# Patient Record
Sex: FEMALE | URBAN - METROPOLITAN AREA
[De-identification: names, ages, dates, MRNs, and addresses within clinical notes are randomized per-mention and may not be internally consistent; named-entity substitution may affect disease eponyms.]

---

## 2024-02-12 ENCOUNTER — TRANSCRIBE ORDERS (OUTPATIENT)
Dept: SCHEDULING | Age: 69
End: 2024-02-12

## 2024-02-12 DIAGNOSIS — N95.9 UNSPECIFIED MENOPAUSAL AND PERIMENOPAUSAL DISORDER: Primary | ICD-10-CM

## 2024-02-12 DIAGNOSIS — Z13.820 SPECIAL SCREENING FOR OSTEOPOROSIS: ICD-10-CM

## 2024-03-19 ENCOUNTER — HOSPITAL ENCOUNTER (OUTPATIENT)
Dept: MAMMOGRAPHY | Age: 69
Discharge: HOME | End: 2024-03-22
Payer: MEDICARE

## 2024-03-19 VITALS — WEIGHT: 132 LBS | HEIGHT: 66 IN | BODY MASS INDEX: 21.21 KG/M2

## 2024-03-19 DIAGNOSIS — Z13.820 SPECIAL SCREENING FOR OSTEOPOROSIS: ICD-10-CM

## 2024-03-19 DIAGNOSIS — Z12.31 ENCOUNTER FOR SCREENING MAMMOGRAM FOR MALIGNANT NEOPLASM OF BREAST: ICD-10-CM

## 2024-03-19 DIAGNOSIS — N95.9 UNSPECIFIED MENOPAUSAL AND PERIMENOPAUSAL DISORDER: ICD-10-CM

## 2024-03-19 PROCEDURE — 77063 BREAST TOMOSYNTHESIS BI: CPT

## 2024-03-19 PROCEDURE — 77080 DXA BONE DENSITY AXIAL: CPT

## 2024-05-03 ENCOUNTER — HOSPITAL ENCOUNTER (OUTPATIENT)
Dept: MAMMOGRAPHY | Age: 69
End: 2024-05-03
Payer: MEDICARE

## 2024-05-03 DIAGNOSIS — R92.8 OTHER ABNORMAL AND INCONCLUSIVE FINDINGS ON DIAGNOSTIC IMAGING OF BREAST: ICD-10-CM

## 2024-05-03 PROCEDURE — G0279 TOMOSYNTHESIS, MAMMO: HCPCS

## 2024-05-29 ENCOUNTER — APPOINTMENT (RX ONLY)
Dept: URBAN - METROPOLITAN AREA CLINIC 330 | Facility: CLINIC | Age: 69
Setting detail: DERMATOLOGY
End: 2024-05-29

## 2024-05-29 DIAGNOSIS — L82.1 OTHER SEBORRHEIC KERATOSIS: ICD-10-CM

## 2024-05-29 DIAGNOSIS — D22 MELANOCYTIC NEVI: ICD-10-CM

## 2024-05-29 DIAGNOSIS — D18.0 HEMANGIOMA: ICD-10-CM

## 2024-05-29 DIAGNOSIS — L81.4 OTHER MELANIN HYPERPIGMENTATION: ICD-10-CM

## 2024-05-29 PROBLEM — D18.01 HEMANGIOMA OF SKIN AND SUBCUTANEOUS TISSUE: Status: ACTIVE | Noted: 2024-05-29

## 2024-05-29 PROBLEM — D22.5 MELANOCYTIC NEVI OF TRUNK: Status: ACTIVE | Noted: 2024-05-29

## 2024-05-29 PROCEDURE — ? TREATMENT REGIMEN

## 2024-05-29 PROCEDURE — ? BODY PHOTOGRAPHY

## 2024-05-29 PROCEDURE — ? COUNSELING

## 2024-05-29 PROCEDURE — 99203 OFFICE O/P NEW LOW 30 MIN: CPT

## 2024-05-29 ASSESSMENT — LOCATION ZONE DERM
LOCATION ZONE: TRUNK
LOCATION ZONE: ARM
LOCATION ZONE: FACE
LOCATION ZONE: LEG

## 2024-05-29 ASSESSMENT — LOCATION DETAILED DESCRIPTION DERM
LOCATION DETAILED: INFERIOR THORACIC SPINE
LOCATION DETAILED: LEFT INFERIOR CENTRAL MALAR CHEEK
LOCATION DETAILED: RIGHT DISTAL DORSAL FOREARM
LOCATION DETAILED: RIGHT MEDIAL MALAR CHEEK
LOCATION DETAILED: SUPERIOR THORACIC SPINE
LOCATION DETAILED: RIGHT PROXIMAL DORSAL FOREARM
LOCATION DETAILED: LEFT PROXIMAL PRETIBIAL REGION
LOCATION DETAILED: LEFT DISTAL DORSAL FOREARM
LOCATION DETAILED: LEFT PROXIMAL DORSAL FOREARM

## 2024-05-29 ASSESSMENT — LOCATION SIMPLE DESCRIPTION DERM
LOCATION SIMPLE: RIGHT CHEEK
LOCATION SIMPLE: LEFT FOREARM
LOCATION SIMPLE: RIGHT FOREARM
LOCATION SIMPLE: UPPER BACK
LOCATION SIMPLE: LEFT PRETIBIAL REGION
LOCATION SIMPLE: LEFT CHEEK

## 2024-05-29 NOTE — PROCEDURE: BODY PHOTOGRAPHY
Detail Level: Detailed
Was The Entire Body Photographed (Cannot Bill Unless Entire Body Photographed)?: Please Select Yes or No - If you select Yes you are confirming that you took full body photographs
Consent was obtained for whole body photography. Patient understands that photograph costs may not be covered by insurance.
Whole Body Statement: The whole body was photographed today.
Number Of Photographs (Optional- Will Not Render If 0): 1
Reason For Photography: The patient is obtaining whole body photography to observe existing suspicious moles and or monitor for the appearance of any new lesions.
normal

## 2024-05-29 NOTE — PROCEDURE: MIPS QUALITY
Quality 226: Preventive Care And Screening: Tobacco Use: Screening And Cessation Intervention: Patient screened for tobacco use and is an ex/non-smoker
Quality 110: Preventive Care And Screening: Influenza Immunization: Influenza Immunization Administered during Influenza season
Quality 130: Documentation Of Current Medications In The Medical Record: Current Medications Documented
Quality 402: Tobacco Use And Help With Quitting Among Adolescents: Patient screened for tobacco and never smoked
Quality 47: Advance Care Plan: Advance care planning not documented, reason not otherwise specified.
Quality 431: Preventive Care And Screening: Unhealthy Alcohol Use - Screening: Patient not identified as an unhealthy alcohol user when screened for unhealthy alcohol use using a systematic screening method
Detail Level: Detailed

## 2024-07-02 ENCOUNTER — OFFICE VISIT (OUTPATIENT)
Dept: ORTHOPEDIC SURGERY | Age: 69
End: 2024-07-02
Payer: MEDICARE

## 2024-07-02 DIAGNOSIS — R52 PAIN: ICD-10-CM

## 2024-07-02 DIAGNOSIS — S82.841A BIMALLEOLAR ANKLE FRACTURE, RIGHT, CLOSED, INITIAL ENCOUNTER: Primary | ICD-10-CM

## 2024-07-02 PROCEDURE — 99204 OFFICE O/P NEW MOD 45 MIN: CPT | Performed by: PHYSICIAN ASSISTANT

## 2024-07-02 PROCEDURE — 1123F ACP DISCUSS/DSCN MKR DOCD: CPT | Performed by: PHYSICIAN ASSISTANT

## 2024-07-02 RX ORDER — HYDROCODONE BITARTRATE AND ACETAMINOPHEN 7.5; 325 MG/1; MG/1
1 TABLET ORAL EVERY 6 HOURS PRN
Qty: 20 TABLET | Refills: 0 | Status: SHIPPED | OUTPATIENT
Start: 2024-07-02 | End: 2024-07-09

## 2024-07-02 RX ORDER — HYDROCODONE BITARTRATE AND ACETAMINOPHEN 5; 325 MG/1; MG/1
1 TABLET ORAL EVERY 4 HOURS PRN
Qty: 30 TABLET | Refills: 0 | Status: CANCELLED | OUTPATIENT
Start: 2024-07-02 | End: 2024-07-07

## 2024-07-02 NOTE — PROGRESS NOTES
Name: Ana Maria Hicks  YOB: 1955  Gender: female  MRN: [id number]    Treatment Plan:   I had a thorough discussion with the patient regarding the treatment plan   Bimalleolar ankle fracture    Patient to proceed with surgery with Dr. Mccauley.  Consent to read: Open reduction and internal fixation of right ankle    Splint reinforced today.  Strict NWB    Patient understands and in full agreement with the treatment plan.    Weight-bearing status: NWB        Return to work/work restrictions: none  Norco 5/325, 1 tab p.o. q6 h PRN pain, disp #20. I discussed the side effects of nausea and vomiting and GI distress.  I also discussed how this is a narcotic and its abuse potential.  I told the patient I am not a long term pain medication provider so this Rx is for a short time only.  I explained the need for accountability and honestly with narcotics.  I explained how overdosing can result in respiratory suppression and potentially death. I also discussed how this medication contains tylenol and they should not take tylenol or other medications that contain tylenol. I also discussed tylenols affect on the liver and advised no alcohol intake while taking this medication.     Patient will be called by Dr. Mccauley's coordinator to schedule surgery       CC: RightAnkle Pain    HPI: Ana Maria Hicks is a 68 y.o. female who sustained a fall on Sunday, 6/30/2024.  Patient states that she was in the parking lot and felt when she missed stepped causing her ankle to roll off the side of the curb.  She presented to the emergency room at [facility] where she was found to have a bimalleolar ankle fracture with dislocation.  Hematoma block was performed and ankle was set.  She was placed in a posterior slab splint splint with a stirrup.  She has been nonweightbearing..  Today they have difficulty walking and bearing weight on this ankle due to pain. They describe pain with movement too.     ROS:  Patient Denies fever/chills,

## 2024-07-02 NOTE — PROGRESS NOTES
Orthopaedic Trauma Clinic Note    Name: Ana Maria Hicks  YOB: 1955  Gender: female  MRN: [id number]  PCP:    CC: Right ankle pain    HPI:   Ana Maria Hicks is a 68 y.o. female with a chief complaint of right ankle pain.  -Sig PMH:  -Sig PSH:    Patient had a mechanical fall on 7/2/2024 while stepping off a curb.  She presented to [facility] ED where radiographs demonstrated right ankle fracture  Referred for further management    No prior history of right ankle trauma  Denies numbness, tingling, weakness in affected extremity.    Patient is accompanied by her  in the clinic today  And is controlled with oral medications    ROS/Meds/PSH/PMH/FH/SH:  Pertinent details discussed in HPI    Physical Examination:  General:  Awake and conversive, no distress, well nourished, age-appropriate.  Neurological: A&O x 3, normal coordination and tone.  Psych: Normal mood, affect and behavior. Normal thought content and judgment. Cooperative w/ exam.    Musculoskeletal Exam:  Right Lower Extremity  - Skin: Intact, though she does have significant swelling  - ROM: Ankle deferred, toes WNL  - Sensation: SILT Sa/Collazo/T/SP/DP   - Motor: 5/5 TA/EHL/FHL/GS   - Digits warm, well-perfused, brisk cap refill    Gait: Exam deferred    Imaging:   I personally reviewed and interpreted:  -7/2/2024: 3 views right ankle demonstrates displaced bimalleolar ankle fracture with Victor B fracture of the distal fibula as well as comminuted fracture of the medial malleolus.  No posterior malleoli or injury        Assessment:     ICD-10-CM    1. Bimalleolar ankle fracture, right, closed, initial encounter  S82.841A traMADol (ULTRAM) 50 MG tablet          Plan:   Imaging was reviewed with the patient and her .  We reviewed natural history of diagnosis and possible complications in detail.   We discussed options for treatment with associated risks and benefits.  -Patient would benefit from open reduction internal fixation right

## 2024-07-03 ENCOUNTER — OFFICE VISIT (OUTPATIENT)
Dept: ORTHOPEDIC SURGERY | Age: 69
End: 2024-07-03
Payer: MEDICARE

## 2024-07-03 DIAGNOSIS — S82.841A BIMALLEOLAR ANKLE FRACTURE, RIGHT, CLOSED, INITIAL ENCOUNTER: Primary | ICD-10-CM

## 2024-07-03 PROCEDURE — 1123F ACP DISCUSS/DSCN MKR DOCD: CPT | Performed by: ORTHOPAEDIC SURGERY

## 2024-07-03 PROCEDURE — 99204 OFFICE O/P NEW MOD 45 MIN: CPT | Performed by: ORTHOPAEDIC SURGERY

## 2024-07-03 RX ORDER — TRAMADOL HYDROCHLORIDE 50 MG/1
50 TABLET ORAL EVERY 4 HOURS PRN
Qty: 30 TABLET | Refills: 0 | Status: SHIPPED | OUTPATIENT
Start: 2024-07-03 | End: 2024-07-08

## 2024-07-03 RX ORDER — ACETAMINOPHEN 500 MG
1000 TABLET ORAL 3 TIMES DAILY PRN
Qty: 180 TABLET | Refills: 0 | Status: ON HOLD | OUTPATIENT
Start: 2024-07-03 | End: 2024-07-10 | Stop reason: HOSPADM

## 2024-07-09 ENCOUNTER — OFFICE VISIT (OUTPATIENT)
Dept: ORTHOPEDIC SURGERY | Age: 69
End: 2024-07-09
Payer: MEDICARE

## 2024-07-09 ENCOUNTER — PREP FOR PROCEDURE (OUTPATIENT)
Dept: ORTHOPEDIC SURGERY | Age: 69
End: 2024-07-09

## 2024-07-09 ENCOUNTER — ANESTHESIA EVENT (OUTPATIENT)
Dept: SURGERY | Age: 69
End: 2024-07-09
Payer: COMMERCIAL

## 2024-07-09 DIAGNOSIS — S82.841A BIMALLEOLAR ANKLE FRACTURE, RIGHT, CLOSED, INITIAL ENCOUNTER: Primary | ICD-10-CM

## 2024-07-09 PROCEDURE — 1123F ACP DISCUSS/DSCN MKR DOCD: CPT | Performed by: ORTHOPAEDIC SURGERY

## 2024-07-09 PROCEDURE — 99213 OFFICE O/P EST LOW 20 MIN: CPT | Performed by: ORTHOPAEDIC SURGERY

## 2024-07-09 RX ORDER — TRAMADOL HYDROCHLORIDE 50 MG/1
50 TABLET ORAL EVERY 6 HOURS PRN
Status: ON HOLD | COMMUNITY
End: 2024-07-10 | Stop reason: HOSPADM

## 2024-07-09 RX ORDER — LEVOTHYROXINE SODIUM 88 UG/1
88 TABLET ORAL DAILY
COMMUNITY

## 2024-07-09 RX ORDER — EZETIMIBE 10 MG/1
10 TABLET ORAL
COMMUNITY

## 2024-07-09 RX ORDER — LOSARTAN POTASSIUM AND HYDROCHLOROTHIAZIDE 12.5; 5 MG/1; MG/1
1 TABLET ORAL DAILY
COMMUNITY

## 2024-07-09 NOTE — PERIOP NOTE
Patient verified name and .  Order for consent  found in EHR and matches case posting; patient verifies procedure.   Type 1B surgery, Pat PHONE assessment complete.  Orders received.  Labs per surgeon: none  Labs per anesthesia protocol: POC K+ DOS    Patient answered medical/surgical history questions at their best of ability. All prior to admission medications documented in EPIC.    Patient instructed to continue taking all prescription medications up to the day of surgery but to take only the following medications the day of surgery according to anesthesia guidelines with a small sip of water: Synthroid, tramadol (if needed) Also, patient is requested to take 2 Tylenol in the morning and then again before bed on the day before surgery. Regular or extra strength may be used.       Patient informed that all vitamins and supplements should be held 7 days prior to surgery and NSAIDS 5 days prior to surgery. Prescription meds to hold:vitamins and supplements. Do not take losartan- HCTZ on the morning of procedure    Patient instructed on the following:    > Arrive at Main Entrance, time of arrival to be called the day before by 1700  > NPO after midnight, unless otherwise indicated, including gum, mints, and ice chips  > Responsible adult must drive patient to the hospital, stay during surgery, and patient will need supervision 24 hours after anesthesia  > Use non moisturizing soap in shower the night before surgery and on the morning of surgery  > All piercings must be removed prior to arrival.    > Leave all valuables (money and jewelry) at home but bring insurance card and ID on DOS.   > You may be required to pay a deductible or co-pay on the day of your procedure. You can pre-pay by calling [phone] if your surgery is at the [facility] or [phone] if your surgery is at the [facility].  > Do not wear make-up, nail polish, lotions, cologne, perfumes, powders, or oil on skin. Artificial nails are not

## 2024-07-09 NOTE — PROGRESS NOTES
both ears.   Nose: Nares normal. Septum midline. Mucosa normal. No drainage or sinus tenderness.   Mouth/Throat: Lips, mucosa, and tongue normal. Teeth and gums normal.   Neck: Supple, symmetrical, trachea midline, no adenopathy, thyroid: no enlargment/tenderness/nodules, no carotid bruit and no JVD.   Back:   Symmetric, no curvature. ROM normal. No CVA tenderness.   Lungs:   Clear to auscultation bilaterally.   Heart:  Regular rate and rhythm, S1, S2 normal, no murmur, click, rub or gallop.   Abdomen:   Soft, non-tender. Bowel sounds normal. No masses,  No organomegaly.       No lymphadenopathy in all 4 extremities  Alignment-she has obvious deformity of her right ankle with some valgus deformity  Range of motion-pain with any range of motion of the right ankle  Vascular-distal pulses palpable in right lower extremity  Sensory/motor-deep tendon reflexes normal right lower extremity.  Motor and sensory function intact.  Stability-gross instability right ankle  Tenderness to palpation throughout the lateral medial malleolar line  Skin-no open wounds  Gait-she cannot put any weight on her right lower extremity    Assessment:       Right closed displaced bimalleolar ankle fracture    Xrays and or studies:    I have reviewed her x-rays which show a right closed displaced bimalleolar ankle fracture    [id number] right minor ankle fracture, findings-3 views of the right ankle shows the right medial lateral malleolar line with some residual lateral translation of the talus on all 3 views.  These are very similar to her injury films.  Impression-right bimalleolar ankle fracture  Plan:     I spoke with the patient regarding different treatment options.  I explained to her that I be happy to talk with her about what nonoperative treatment would involve but she really would like to go and proceed with operative treatment.  I definitely think this is 1 that she would be much happier with operative treatment.  So

## 2024-07-09 NOTE — H&P (VIEW-ONLY)
explained the overall plan and what the final construct would look like to her and her family.  They seem to understand that we would be proceeding to the operating room for outpatient open reduction internal fixation of right bimalleolar ankle fracture this week in the operating room.    Signed By: Hipolito Driver MD     July 9, 2024

## 2024-07-09 NOTE — H&P
History and physical    Patient: Ana Maria Hicks MRN: [id number]  SSN: [id number]    YOB: 1955  Age: 68 y.o.  Sex: female      Subjective:      Ana Maria Hicks is a 68 y.o. female who is about a week out from an injury where she just lost her balance and twisted her right lower extremity.  She was seen and found to have a right bimalleolar ankle fracture.  She is here today for a swelling check just to see how much she is swollen and see if we can proceed with fixation.  She been very uncomfortable with her right lower extremity.  Has been very cooperative with nonweightbearing on the right lower extremity.    No past medical history on file.  Past Surgical History:   Procedure Laterality Date    HYSTERECTOMY (CERVIX STATUS UNKNOWN)      OVARY REMOVAL      unilateral      FAMHX -No history of inflammatory arthritis   Social History     Tobacco Use    Smoking status: Not on file    Smokeless tobacco: Not on file   Substance Use Topics    Alcohol use: Not on file      Current Outpatient Medications   Medication Sig Dispense Refill    acetaminophen (TYLENOL) 500 MG tablet Take 2 tablets by mouth 3 times daily as needed for Pain 180 tablet 0    HYDROcodone-acetaminophen (NORCO) 7.5-325 MG per tablet Take 1 tablet by mouth every 6 hours as needed for Pain for up to 7 days. Intended supply: 5 days. Take lowest dose possible to manage pain Max Daily Amount: 4 tablets 20 tablet 0     No current facility-administered medications for this visit.        Allergies   Allergen Reactions    Penicillins Hives       Review of Systems:  A comprehensive review of systems was negative.    Objective:     There were no vitals filed for this visit.     Physical Exam:  Physical Exam:  General:  Alert, cooperative, no distress, appears stated age. Orientation she is alert oriented person place time and situation   Eyes:  Conjunctivae/corneas clear. PERRL, EOMs intact. Fundi benign   Ears:  Normal TMs and external ear canals

## 2024-07-10 ENCOUNTER — APPOINTMENT (OUTPATIENT)
Dept: GENERAL RADIOLOGY | Age: 69
End: 2024-07-10
Attending: ORTHOPAEDIC SURGERY
Payer: COMMERCIAL

## 2024-07-10 ENCOUNTER — HOSPITAL ENCOUNTER (OUTPATIENT)
Age: 69
Setting detail: OUTPATIENT SURGERY
Discharge: HOME | End: 2024-07-10
Attending: ORTHOPAEDIC SURGERY | Admitting: ORTHOPAEDIC SURGERY
Payer: COMMERCIAL

## 2024-07-10 ENCOUNTER — ANESTHESIA (OUTPATIENT)
Dept: SURGERY | Age: 69
End: 2024-07-10
Payer: COMMERCIAL

## 2024-07-10 VITALS
TEMPERATURE: 97.6 F | RESPIRATION RATE: 14 BRPM | DIASTOLIC BLOOD PRESSURE: 67 MMHG | WEIGHT: 135 LBS | SYSTOLIC BLOOD PRESSURE: 121 MMHG | OXYGEN SATURATION: 97 % | BODY MASS INDEX: 21.69 KG/M2 | HEART RATE: 68 BPM | HEIGHT: 66 IN

## 2024-07-10 DIAGNOSIS — S82.841A BIMALLEOLAR ANKLE FRACTURE, RIGHT, CLOSED, INITIAL ENCOUNTER: Primary | ICD-10-CM

## 2024-07-10 LAB
ABO + RH BLD: NORMAL
BLOOD GROUP ANTIBODIES SERPL: NORMAL
SPECIMEN EXP DATE BLD: NORMAL

## 2024-07-10 PROCEDURE — 2500000003 HC RX 250 WO HCPCS

## 2024-07-10 PROCEDURE — 7100000000 HC PACU RECOVERY - FIRST 15 MIN: Performed by: ORTHOPAEDIC SURGERY

## 2024-07-10 PROCEDURE — 2720000010 HC SURG SUPPLY STERILE: Performed by: ORTHOPAEDIC SURGERY

## 2024-07-10 PROCEDURE — 3600000004 HC SURGERY LEVEL 4 BASE: Performed by: ORTHOPAEDIC SURGERY

## 2024-07-10 PROCEDURE — 86901 BLOOD TYPING SEROLOGIC RH(D): CPT

## 2024-07-10 PROCEDURE — C1713 ANCHOR/SCREW BN/BN,TIS/BN: HCPCS | Performed by: ORTHOPAEDIC SURGERY

## 2024-07-10 PROCEDURE — 6360000002 HC RX W HCPCS: Performed by: ANESTHESIOLOGY

## 2024-07-10 PROCEDURE — 6360000002 HC RX W HCPCS: Performed by: NURSE ANESTHETIST, CERTIFIED REGISTERED

## 2024-07-10 PROCEDURE — 64445 NJX AA&/STRD SCIATIC NRV IMG: CPT | Performed by: ANESTHESIOLOGY

## 2024-07-10 PROCEDURE — 7100000011 HC PHASE II RECOVERY - ADDTL 15 MIN: Performed by: ORTHOPAEDIC SURGERY

## 2024-07-10 PROCEDURE — 2500000003 HC RX 250 WO HCPCS: Performed by: NURSE ANESTHETIST, CERTIFIED REGISTERED

## 2024-07-10 PROCEDURE — 6360000002 HC RX W HCPCS: Performed by: ORTHOPAEDIC SURGERY

## 2024-07-10 PROCEDURE — 86850 RBC ANTIBODY SCREEN: CPT

## 2024-07-10 PROCEDURE — 2580000003 HC RX 258: Performed by: ANESTHESIOLOGY

## 2024-07-10 PROCEDURE — 6370000000 HC RX 637 (ALT 250 FOR IP): Performed by: ANESTHESIOLOGY

## 2024-07-10 PROCEDURE — 2580000003 HC RX 258: Performed by: NURSE ANESTHETIST, CERTIFIED REGISTERED

## 2024-07-10 PROCEDURE — 7100000010 HC PHASE II RECOVERY - FIRST 15 MIN: Performed by: ORTHOPAEDIC SURGERY

## 2024-07-10 PROCEDURE — 3700000000 HC ANESTHESIA ATTENDED CARE: Performed by: ORTHOPAEDIC SURGERY

## 2024-07-10 PROCEDURE — 3700000001 HC ADD 15 MINUTES (ANESTHESIA): Performed by: ORTHOPAEDIC SURGERY

## 2024-07-10 PROCEDURE — 3600000014 HC SURGERY LEVEL 4 ADDTL 15MIN: Performed by: ORTHOPAEDIC SURGERY

## 2024-07-10 PROCEDURE — 2709999900 HC NON-CHARGEABLE SUPPLY: Performed by: ORTHOPAEDIC SURGERY

## 2024-07-10 PROCEDURE — 73600 X-RAY EXAM OF ANKLE: CPT

## 2024-07-10 PROCEDURE — 7100000001 HC PACU RECOVERY - ADDTL 15 MIN: Performed by: ORTHOPAEDIC SURGERY

## 2024-07-10 PROCEDURE — 64447 NJX AA&/STRD FEMORAL NRV IMG: CPT | Performed by: ANESTHESIOLOGY

## 2024-07-10 PROCEDURE — C1769 GUIDE WIRE: HCPCS | Performed by: ORTHOPAEDIC SURGERY

## 2024-07-10 PROCEDURE — 86900 BLOOD TYPING SEROLOGIC ABO: CPT

## 2024-07-10 RX ORDER — SODIUM CHLORIDE, SODIUM LACTATE, POTASSIUM CHLORIDE, CALCIUM CHLORIDE 600; 310; 30; 20 MG/100ML; MG/100ML; MG/100ML; MG/100ML
INJECTION, SOLUTION INTRAVENOUS CONTINUOUS
Status: DISCONTINUED | OUTPATIENT
Start: 2024-07-10 | End: 2024-07-10 | Stop reason: HOSPADM

## 2024-07-10 RX ORDER — EPHEDRINE SULFATE 5 MG/ML
INJECTION INTRAVENOUS PRN
Status: DISCONTINUED | OUTPATIENT
Start: 2024-07-10 | End: 2024-07-10 | Stop reason: SDUPTHER

## 2024-07-10 RX ORDER — DEXAMETHASONE SODIUM PHOSPHATE 10 MG/ML
INJECTION, SOLUTION INTRAMUSCULAR; INTRAVENOUS
Status: COMPLETED | OUTPATIENT
Start: 2024-07-10 | End: 2024-07-10

## 2024-07-10 RX ORDER — LIDOCAINE HYDROCHLORIDE 20 MG/ML
INJECTION, SOLUTION EPIDURAL; INFILTRATION; INTRACAUDAL; PERINEURAL PRN
Status: DISCONTINUED | OUTPATIENT
Start: 2024-07-10 | End: 2024-07-10 | Stop reason: SDUPTHER

## 2024-07-10 RX ORDER — SODIUM CHLORIDE 0.9 % (FLUSH) 0.9 %
5-40 SYRINGE (ML) INJECTION EVERY 12 HOURS SCHEDULED
Status: DISCONTINUED | OUTPATIENT
Start: 2024-07-10 | End: 2024-07-10 | Stop reason: HOSPADM

## 2024-07-10 RX ORDER — FENTANYL CITRATE 50 UG/ML
100 INJECTION, SOLUTION INTRAMUSCULAR; INTRAVENOUS
Status: COMPLETED | OUTPATIENT
Start: 2024-07-10 | End: 2024-07-10

## 2024-07-10 RX ORDER — SODIUM CHLORIDE 0.9 % (FLUSH) 0.9 %
5-40 SYRINGE (ML) INJECTION PRN
Status: DISCONTINUED | OUTPATIENT
Start: 2024-07-10 | End: 2024-07-10 | Stop reason: HOSPADM

## 2024-07-10 RX ORDER — SODIUM CHLORIDE 9 MG/ML
INJECTION, SOLUTION INTRAVENOUS PRN
Status: DISCONTINUED | OUTPATIENT
Start: 2024-07-10 | End: 2024-07-10 | Stop reason: HOSPADM

## 2024-07-10 RX ORDER — ONDANSETRON 2 MG/ML
4 INJECTION INTRAMUSCULAR; INTRAVENOUS
Status: DISCONTINUED | OUTPATIENT
Start: 2024-07-10 | End: 2024-07-10 | Stop reason: HOSPADM

## 2024-07-10 RX ORDER — LIDOCAINE HYDROCHLORIDE 10 MG/ML
1 INJECTION, SOLUTION INFILTRATION; PERINEURAL
Status: DISCONTINUED | OUTPATIENT
Start: 2024-07-10 | End: 2024-07-10 | Stop reason: HOSPADM

## 2024-07-10 RX ORDER — NALOXONE HYDROCHLORIDE 0.4 MG/ML
INJECTION, SOLUTION INTRAMUSCULAR; INTRAVENOUS; SUBCUTANEOUS PRN
Status: DISCONTINUED | OUTPATIENT
Start: 2024-07-10 | End: 2024-07-10 | Stop reason: HOSPADM

## 2024-07-10 RX ORDER — HALOPERIDOL 5 MG/ML
1 INJECTION INTRAMUSCULAR
Status: DISCONTINUED | OUTPATIENT
Start: 2024-07-10 | End: 2024-07-10 | Stop reason: HOSPADM

## 2024-07-10 RX ORDER — OXYCODONE HYDROCHLORIDE AND ACETAMINOPHEN 5; 325 MG/1; MG/1
1 TABLET ORAL EVERY 6 HOURS PRN
Qty: 20 TABLET | Refills: 0 | Status: SHIPPED | OUTPATIENT
Start: 2024-07-10 | End: 2024-07-15

## 2024-07-10 RX ORDER — DEXTROSE MONOHYDRATE 100 MG/ML
INJECTION, SOLUTION INTRAVENOUS CONTINUOUS PRN
Status: DISCONTINUED | OUTPATIENT
Start: 2024-07-10 | End: 2024-07-10 | Stop reason: HOSPADM

## 2024-07-10 RX ORDER — HYDROMORPHONE HYDROCHLORIDE 2 MG/ML
0.25 INJECTION, SOLUTION INTRAMUSCULAR; INTRAVENOUS; SUBCUTANEOUS EVERY 5 MIN PRN
Status: DISCONTINUED | OUTPATIENT
Start: 2024-07-10 | End: 2024-07-10 | Stop reason: HOSPADM

## 2024-07-10 RX ORDER — ONDANSETRON 2 MG/ML
INJECTION INTRAMUSCULAR; INTRAVENOUS PRN
Status: DISCONTINUED | OUTPATIENT
Start: 2024-07-10 | End: 2024-07-10 | Stop reason: SDUPTHER

## 2024-07-10 RX ORDER — IBUPROFEN 600 MG/1
1 TABLET ORAL PRN
Status: DISCONTINUED | OUTPATIENT
Start: 2024-07-10 | End: 2024-07-10 | Stop reason: HOSPADM

## 2024-07-10 RX ORDER — PROPOFOL 10 MG/ML
INJECTION, EMULSION INTRAVENOUS PRN
Status: DISCONTINUED | OUTPATIENT
Start: 2024-07-10 | End: 2024-07-10 | Stop reason: SDUPTHER

## 2024-07-10 RX ORDER — OXYCODONE HYDROCHLORIDE 5 MG/1
5 TABLET ORAL
Status: COMPLETED | OUTPATIENT
Start: 2024-07-10 | End: 2024-07-10

## 2024-07-10 RX ORDER — MIDAZOLAM HYDROCHLORIDE 2 MG/2ML
2 INJECTION, SOLUTION INTRAMUSCULAR; INTRAVENOUS
Status: COMPLETED | OUTPATIENT
Start: 2024-07-10 | End: 2024-07-10

## 2024-07-10 RX ORDER — ACETAMINOPHEN 500 MG
1000 TABLET ORAL ONCE
Status: COMPLETED | OUTPATIENT
Start: 2024-07-10 | End: 2024-07-10

## 2024-07-10 RX ADMIN — ACETAMINOPHEN 1000 MG: 500 TABLET, FILM COATED ORAL at 09:31

## 2024-07-10 RX ADMIN — PHENYLEPHRINE HYDROCHLORIDE 100 MCG: 10 INJECTION INTRAVENOUS at 12:37

## 2024-07-10 RX ADMIN — DEXAMETHASONE SODIUM PHOSPHATE 4 MG: 10 INJECTION, SOLUTION INTRAMUSCULAR; INTRAVENOUS at 09:46

## 2024-07-10 RX ADMIN — ONDANSETRON 4 MG: 2 INJECTION INTRAMUSCULAR; INTRAVENOUS at 12:26

## 2024-07-10 RX ADMIN — PHENYLEPHRINE HYDROCHLORIDE 100 MCG: 10 INJECTION INTRAVENOUS at 12:28

## 2024-07-10 RX ADMIN — SODIUM CHLORIDE, SODIUM LACTATE, POTASSIUM CHLORIDE, AND CALCIUM CHLORIDE: 600; 310; 30; 20 INJECTION, SOLUTION INTRAVENOUS at 13:03

## 2024-07-10 RX ADMIN — EPHEDRINE SULFATE 5 MG: 5 INJECTION INTRAVENOUS at 12:45

## 2024-07-10 RX ADMIN — FENTANYL CITRATE 100 MCG: 50 INJECTION INTRAMUSCULAR; INTRAVENOUS at 09:46

## 2024-07-10 RX ADMIN — HYDROMORPHONE HYDROCHLORIDE 0.25 MG: 2 INJECTION INTRAMUSCULAR; INTRAVENOUS; SUBCUTANEOUS at 13:21

## 2024-07-10 RX ADMIN — ROPIVACAINE HYDROCHLORIDE 15 ML: 5 INJECTION, SOLUTION EPIDURAL; INFILTRATION; PERINEURAL at 09:46

## 2024-07-10 RX ADMIN — DEXAMETHASONE SODIUM PHOSPHATE 4 MG: 10 INJECTION, SOLUTION INTRAMUSCULAR; INTRAVENOUS at 09:49

## 2024-07-10 RX ADMIN — SODIUM CHLORIDE, SODIUM LACTATE, POTASSIUM CHLORIDE, AND CALCIUM CHLORIDE: 600; 310; 30; 20 INJECTION, SOLUTION INTRAVENOUS at 09:29

## 2024-07-10 RX ADMIN — MIDAZOLAM 2 MG: 1 INJECTION INTRAMUSCULAR; INTRAVENOUS at 09:46

## 2024-07-10 RX ADMIN — OXYCODONE HYDROCHLORIDE 5 MG: 5 TABLET ORAL at 13:21

## 2024-07-10 RX ADMIN — ROPIVACAINE HYDROCHLORIDE 15 ML: 5 INJECTION, SOLUTION EPIDURAL; INFILTRATION; PERINEURAL at 09:49

## 2024-07-10 RX ADMIN — Medication 2000 MG: at 12:26

## 2024-07-10 RX ADMIN — PHENYLEPHRINE HYDROCHLORIDE 100 MCG: 10 INJECTION INTRAVENOUS at 12:45

## 2024-07-10 RX ADMIN — EPHEDRINE SULFATE 5 MG: 5 INJECTION INTRAVENOUS at 12:36

## 2024-07-10 RX ADMIN — LIDOCAINE HYDROCHLORIDE 60 MG: 20 INJECTION, SOLUTION EPIDURAL; INFILTRATION; INTRACAUDAL; PERINEURAL at 12:20

## 2024-07-10 RX ADMIN — PROPOFOL 150 MG: 10 INJECTION, EMULSION INTRAVENOUS at 12:20

## 2024-07-10 ASSESSMENT — PAIN - FUNCTIONAL ASSESSMENT
PAIN_FUNCTIONAL_ASSESSMENT: NONE - DENIES PAIN
PAIN_FUNCTIONAL_ASSESSMENT: 0-10

## 2024-07-10 ASSESSMENT — PAIN DESCRIPTION - LOCATION: LOCATION: ANKLE;INCISION

## 2024-07-10 ASSESSMENT — PAIN SCALES - GENERAL: PAINLEVEL_OUTOF10: 5

## 2024-07-10 ASSESSMENT — PAIN DESCRIPTION - DESCRIPTORS
DESCRIPTORS: DULL
DESCRIPTORS: ACHING

## 2024-07-10 NOTE — ANESTHESIA PROCEDURE NOTES
Peripheral Block    Patient location during procedure: pre-op  Reason for block: post-op pain management and at surgeon's request  Start time: 7/10/2024 9:49 AM  End time: 7/10/2024 9:52 AM  Staffing  Performed: anesthesiologist   Anesthesiologist: Molly Garcia MD  Performed by: Molly Garcia MD  Authorized by: Molly Garcia MD    Preanesthetic Checklist  Completed: patient identified, IV checked, site marked, risks and benefits discussed, surgical/procedural consents, equipment checked, pre-op evaluation, timeout performed, anesthesia consent given, oxygen available and monitors applied/VS acknowledged  Peripheral Block   Patient position: supine  Prep: ChloraPrep  Provider prep: sterile gloves and mask  Patient monitoring: cardiac monitor, continuous pulse ox, frequent blood pressure checks, IV access, oxygen and responsive to questions  Block type: Femoral  Adductor canal  Laterality: right  Injection technique: single-shot  Guidance: ultrasound guided    Needle   Needle type: insulated echogenic nerve stimulator needle   Needle gauge: 20 G  Needle localization: ultrasound guidance  Needle length: 10 cm  Assessment   Injection assessment: negative aspiration for heme, no paresthesia on injection, local visualized surrounding nerve on ultrasound and no intravascular symptoms  Paresthesia pain: none  Slow fractionated injection: yes  Hemodynamics: stable  Outcomes: uncomplicated and patient tolerated procedure well    Additional Notes  Ultrasound image taken and stored in chart   Medications Administered  dexAMETHasone (DECADRON) (PF) 10 mg/mL injection - Other   4 mg - 7/10/2024 9:49:00 AM  ROPivacaine 0.5% with EPINEPHrine 1:[id number] injection (ANESTHESIA USE ONLY) (Mixture components: EPINEPHrine PF 1 MG/ML Soln, 0.005 mL; ROPivacaine 0.5% Soln, 1 mL) - Perineural   15 mL - 7/10/2024 9:49:00 AM

## 2024-07-10 NOTE — INTERVAL H&P NOTE
Update History & Physical    The Patient's History and Physical of 7/9/2024 was reviewed with the patient and I examined the patient.  There was no change.  The surgical site was confirmed by the patient and me.    Plan:  The risk, benefits, expected outcome, and alternative to the recommended procedure have been discussed with the patient.  Patient understands and wants to proceed with open treatment of right bimalleolar ankle fracture with screw fixation.    Electronically signed by Hipolito Driver MD on 7/10/2024 at 9:54 AM

## 2024-07-10 NOTE — ANESTHESIA POSTPROCEDURE EVALUATION
Department of Anesthesiology  Postprocedure Note    Patient: Ana Maria Hicks  MRN: [id number]  YOB: 1955  Date of evaluation: 7/10/2024    Procedure Summary       Date: 07/10/24 Room / Location: [facility] MAIN OR [address] [place] [address] MAIN OR    Anesthesia Start: 1215 Anesthesia Stop: 1312    Procedure: RIGHT ANKLE BEBE FX OPEN REDUCTION INTERNAL FIXATION (Right: Ankle) Diagnosis:       Bimalleolar fracture of right ankle      (Bimalleolar fracture of right ankle [S82.841A])    Providers: Hipolito Driver MD Responsible Provider: Molly Garcia MD    Anesthesia Type: General ASA Status: 3            Anesthesia Type: General    Helio Phase I: Helio Score: 8    Helio Phase II: Helio Score: 10    Anesthesia Post Evaluation    Patient location during evaluation: PACU  Patient participation: complete - patient participated  Level of consciousness: awake and alert  Airway patency: patent  Nausea: well controlled.  Cardiovascular status: acceptable.  Respiratory status: acceptable  Hydration status: stable  Pain management: adequate    No notable events documented.

## 2024-07-10 NOTE — OP NOTE
Procedure: Open treatment of right bimalleolar ankle fracture with screw fixation    Procedure in Detail: After the successful duction of general anesthetic the right lower extremity was prepped and draped in usual sterile fashion.  I then made a small incision with a 15 blade distally on the lateral aspect of the ankle and then I was able to place a drill bit first and then a 3.5 mm tap into the lateral malleolus.  Once this was in appropriate position I used this to joystick the lateral malleolar fragment and hold the fibula out to length.  I then used the guidewire from the [employer]ynthes 4 cannulated screw set to capture this and basically make sure the fibula is held out to length as I placed these from the fibula into the distal tibia.  Actually in placing 2 of these and then replace them with 4 oh cannulated screws 1 more proximal one of the more distal.  Once these were tightened down by hand with the help of me placing a varus type moment on the ankle as I tightened my screws this nicely reduced the residual displacement of the lateral malleolus and it did appear that the fibula was out to length I then placed a single 110 mm Synthes small fragment 3.5 screw down the shaft of the fibula as a intramedullary screw.  Once this was in appropriate position I was very pleased with the overall alignment of the ankle.  The medial malleolus was essentially reduced at this point so I made a small incision and then placed first 1 screw and then a second in the medial malleolus which were cannulated screws.  These were both tightened down and then the final reduction in place of my hardware was reviewed with the C arm image intensifier.  Once I was satisfied that the ankle mortise was very well aligned and that the fracture was reduced and the hardware was in excellent position I removed the guidewires and then closed my incision with staples.  Dressings were then applied and the patient was placed in a walking boot.  She

## 2024-07-10 NOTE — ANESTHESIA PRE PROCEDURE
IntraVENous PRN Molly Garcia MD        midazolam PF (VERSED) injection 2 mg  2 mg IntraVENous Once PRN Molly Garcia MD        ceFAZolin (ANCEF) 2000 mg in sterile water 20 mL IV syringe  2,000 mg IntraVENous On Call to OR Hipolito Driver MD           Allergies:    Allergies   Allergen Reactions    Penicillins Hives       Problem List:    Patient Active Problem List   Diagnosis Code    Bimalleolar ankle fracture, right, closed, initial encounter S82.841A    Bimalleolar fracture of right ankle S82.841A       Past Medical History:        Diagnosis Date    Aortic aneurysm (HCC)     per MD note in [facility]: Her most recent CT scan shows stable ascending aortic aneurysm of 4.1 cm.    History of cervical cancer     History of sepsis 02/2024    History of staph infection     Hypertension     managed with medication    Hypothyroid     Synthroid       Past Surgical History:        Procedure Laterality Date    CHOLECYSTECTOMY      HYSTERECTOMY (CERVIX STATUS UNKNOWN)      OVARY REMOVAL      unilateral    TONSILLECTOMY         Social History:    Social History     Tobacco Use    Smoking status: Never    Smokeless tobacco: Never   Substance Use Topics    Alcohol use: Not Currently                                Counseling given: Not Answered      Vital Signs (Current):   Vitals:    07/09/24 1328 07/10/24 0858   BP:  (!) 142/66   Pulse:  66   Resp:  18   Temp:  97.4 °F (36.3 °C)   TempSrc:  Oral   SpO2:  97%   Weight: 61.2 kg (135 lb) 61.2 kg (135 lb)   Height: 1.676 m (5' 6\") 1.676 m (5' 6\")                                              BP Readings from Last 3 Encounters:   07/10/24 (!) 142/66       NPO Status:                                                                                 BMI:   Wt Readings from Last 3 Encounters:   07/10/24 61.2 kg (135 lb)   03/19/24 59.9 kg (132 lb)     Body mass index is 21.79 kg/m².    CBC: No results found for: \"WBC\", \"RBC\", \"HGB\", \"HCT\", \"MCV\", \"RDW\", \"PLT\"    CMP: No results found

## 2024-07-10 NOTE — DISCHARGE INSTRUCTIONS
healthy lifestyle:  No smoking/ No tobacco products/ Avoid exposure to second hand smoke  Surgeon General's Warning:  Quitting smoking now greatly reduces serious risk to your health.  Obesity, smoking, and sedentary lifestyle greatly increases your risk for illness  A healthy diet, regular physical exercise & weight monitoring are important for maintaining a healthy lifestyle    You may be retaining fluid if you have a history of heart failure or if you experience any of the following symptoms:  Weight gain of 3 pounds or more overnight or 5 pounds in a week, increased swelling in our hands or feet or shortness of breath while lying flat in bed.  Please call your doctor as soon as you notice any of these symptoms; do not wait until your next office visit.

## 2024-07-10 NOTE — ANESTHESIA PROCEDURE NOTES
Peripheral Block    Patient location during procedure: pre-op  Reason for block: post-op pain management and at surgeon's request  Start time: 7/10/2024 9:46 AM  End time: 7/10/2024 9:48 AM  Staffing  Performed: anesthesiologist   Anesthesiologist: Molly Garcia MD  Performed by: Molly Garcia MD  Authorized by: Molly Garcia MD    Preanesthetic Checklist  Completed: patient identified, IV checked, site marked, risks and benefits discussed, surgical/procedural consents, equipment checked, pre-op evaluation, timeout performed, anesthesia consent given, oxygen available and monitors applied/VS acknowledged  Peripheral Block   Patient position: supine  Prep: ChloraPrep  Provider prep: mask and sterile gloves  Patient monitoring: cardiac monitor, continuous pulse ox, frequent blood pressure checks, IV access, oxygen and responsive to questions  Block type: Sciatic  Popliteal  Laterality: right  Injection technique: single-shot  Guidance: ultrasound guided    Needle   Needle type: insulated echogenic nerve stimulator needle   Needle gauge: 20 G  Needle localization: ultrasound guidance  Needle length: 10 cm  Assessment   Injection assessment: negative aspiration for heme, no paresthesia on injection, local visualized surrounding nerve on ultrasound and no intravascular symptoms  Paresthesia pain: none  Slow fractionated injection: yes  Hemodynamics: stable  Outcomes: uncomplicated and patient tolerated procedure well    Additional Notes  Ultrasound image taken and stored in chart   Medications Administered  dexAMETHasone (DECADRON) (PF) 10 mg/mL injection - Other   4 mg - 7/10/2024 9:46:00 AM  ROPivacaine 0.5% with EPINEPHrine 1:[id number] injection (ANESTHESIA USE ONLY) (Mixture components: EPINEPHrine PF 1 MG/ML Soln, 0.005 mL; ROPivacaine 0.5% Soln, 1 mL) - Perineural   15 mL - 7/10/2024 9:46:00 AM

## 2024-07-25 ENCOUNTER — OFFICE VISIT (OUTPATIENT)
Dept: ORTHOPEDIC SURGERY | Age: 69
End: 2024-07-25

## 2024-07-25 DIAGNOSIS — S82.841A BIMALLEOLAR ANKLE FRACTURE, RIGHT, CLOSED, INITIAL ENCOUNTER: Primary | ICD-10-CM

## 2024-07-25 PROCEDURE — 99024 POSTOP FOLLOW-UP VISIT: CPT | Performed by: ORTHOPAEDIC SURGERY

## 2024-07-25 NOTE — PROGRESS NOTES
Progress Note    Patient: Ana Maria Hicks MRN: [id number]  SSN: [id number]    YOB: 1955  Age: 68 y.o.  Sex: female        7/25/2024      Subjective:     Patient is here today in follow-up.  She is about 2 weeks out from right bimalleolar ankle fracture treated with screw fixation.  She is still having some pain in her ankle and.  She says she is only been able to tolerate some weightbearing and has had to use a wheelchair just especially for long distances.  She has a history of a previous injury to her foot as well as sort of a bad knee so this is hampered her little bit from really being able to get up and walk with her walker so she has been pretty reliant on her wheelchair.  Objective:     There were no vitals filed for this visit.       Physical Exam:     Skin - incision is well healed with no redness or drainage  Motor and sensory function intact in RIGHT LOWER extremity  Pulses palpable in RIGHT LOWER extremity     XRAY FINDINGS:  No new x-rays today    Assessment:     2 weeks out from open treatment of right bimalleolar ankle fracture    Plan:     I think that one of the things we can do is get her a wheelchair for home use.  We can make sure she has a handicap parking sticker as well.  As far as her ankle is concerned we can get her staples out she can use her boot when she is up and around she does not need to wear the boot at night.  She can put some weight on her right lower extremity so she can weight-bear as tolerated but I just encouraged her to really only weight-bear if she is not having any pain in 2 allow herself to have some time to heal before she really pushes that.  She will start some outpatient therapy for full active and aggressive passive range of motion of the right ankle and full strengthening of the right ankle.  This to be 2-3 times a week for 4 to 6 weeks.  I will see her back in about 4 weeks with 3 views right ankle on return she will be about 6 weeks out

## 2024-07-29 ENCOUNTER — TELEPHONE (OUTPATIENT)
Dept: ORTHOPEDIC SURGERY | Age: 69
End: 2024-07-29

## 2024-07-29 NOTE — TELEPHONE ENCOUNTER
She got a RX for a wheelchair but she thinks she needs a knee scooter. She also has another question about something that she thinks she needs. She is asking for a return call.

## 2024-07-29 NOTE — TELEPHONE ENCOUNTER
Spoke with patient. Her  is coming by the office tomorrow to  an order for a knee scooter and an even up. Everything is ready and located at the  of [facility] downw.

## 2024-08-22 ENCOUNTER — OFFICE VISIT (OUTPATIENT)
Dept: ORTHOPEDIC SURGERY | Age: 69
End: 2024-08-22

## 2024-08-22 DIAGNOSIS — S82.841A BIMALLEOLAR ANKLE FRACTURE, RIGHT, CLOSED, INITIAL ENCOUNTER: Primary | ICD-10-CM

## 2024-08-22 RX ORDER — PREGABALIN 75 MG/1
75 CAPSULE ORAL 2 TIMES DAILY
Qty: 60 CAPSULE | Refills: 0 | Status: SHIPPED | OUTPATIENT
Start: 2024-08-22 | End: 2024-09-21

## 2024-08-22 RX ORDER — CYCLOBENZAPRINE HCL 10 MG
10 TABLET ORAL 3 TIMES DAILY PRN
Qty: 30 TABLET | Refills: 1 | Status: SHIPPED | OUTPATIENT
Start: 2024-08-22 | End: 2024-09-11

## 2024-08-22 NOTE — PROGRESS NOTES
Progress Note    Patient: Ana Maria Hicks MRN: [id number]  SSN: [id number]    YOB: 1955  Age: 68 y.o.  Sex: female        8/22/2024      Subjective:     Patient is here today in follow-up for her right ankle fracture.  She states that she is doing pretty well with her ankle she is really having issues with her back.  She says she just would have has had an episode where she feels that she is thrown her back out she has a lot of pain which she says started out sort of in her buttocks and was really on the right side and the left side now is pretty much the left side it does radiate down a little bit.  She has not really had too much of an episode with this before.  She thinks a lot of this is coming from her gait just trying to stay balance with wearing the boot.  The ankle itself seems to be doing well    Objective:     There were no vitals filed for this visit.       Physical Exam:     Skin - incision is well healed with no redness or drainage  Motor and sensory function intact in RIGHT LOWER extremity  Pulses palpable in RIGHT LOWER extremity   On exam she does have a little bit of instability especially with her anterior drawer and with valgus stress of the ankle and hindfoot  XRAY FINDINGS:  [id number] right ankle fracture, findings-3 views of the right ankle shows the right ankle fracture shows significant evidence of healing on at all the primary fracture lines.  The hardware is intact with no evidence of loosening or failure the mortise is well aligned.  Impression is healing well aligned right bimalleolar ankle fracture    Assessment:     #1 healing ankle fracture #2 lower back pain with radicular symptoms    Plan:     So I think we can to try couple things were to try some Flexeril and some Lyrica for her lower back to see if this makes her feel little bit better.  I think she can come out of the boot now and go into a normal shoe would not give her a brace to

## 2024-10-05 DIAGNOSIS — S82.841A BIMALLEOLAR ANKLE FRACTURE, RIGHT, CLOSED, INITIAL ENCOUNTER: ICD-10-CM

## 2024-10-11 RX ORDER — PREGABALIN 75 MG/1
75 CAPSULE ORAL 2 TIMES DAILY
Qty: 60 CAPSULE | Refills: 0 | OUTPATIENT
Start: 2024-10-11 | End: 2024-11-10

## 2024-10-15 ENCOUNTER — OFFICE VISIT (OUTPATIENT)
Dept: ORTHOPEDIC SURGERY | Age: 69
End: 2024-10-15
Payer: MEDICARE

## 2024-10-15 DIAGNOSIS — S82.841D CLOSED BIMALLEOLAR FRACTURE OF RIGHT ANKLE WITH ROUTINE HEALING, SUBSEQUENT ENCOUNTER: Primary | ICD-10-CM

## 2024-10-15 PROCEDURE — 99213 OFFICE O/P EST LOW 20 MIN: CPT | Performed by: ORTHOPAEDIC SURGERY

## 2024-10-15 PROCEDURE — 1123F ACP DISCUSS/DSCN MKR DOCD: CPT | Performed by: ORTHOPAEDIC SURGERY

## 2024-10-15 NOTE — PROGRESS NOTES
Progress Note    Patient: Ana Maria Hicks MRN: [id number]  SSN: [id number]    YOB: 1955  Age: 68 y.o.  Sex: female        10/15/2024      Subjective:     Patient is here today in follow-up.  She is about 3 to 3-1/2 months out from open reduction internal fixation of her right bimalleolar ankle fracture.  She really is improving.  She does still have a little bit of pain though that she localizes mostly over the lateral aspect of her ankle.  She says physical therapy has helped quite a bit.  She has a few more visits left and is going to sort of transition her to a home exercise program.  In general she feels that she is doing pretty well    Objective:     There were no vitals filed for this visit.       Physical Exam:     Skin - incision is well healed with no redness or drainage  Motor and sensory function intact in RIGHT LOWER extremity  Pulses palpable in RIGHT LOWER extremity     XRAY FINDINGS:  [id number] right bimalleolar ankle fracture, findings-3 views of the right ankle shows the right medial and lateral malleolar I have both healed healed very good position.  The hardware is intact with no evidence of loosening or failure.  Impression-healed right bimalleolar ankle fracture in good position    Assessment:     #1 healed right bimalleolar ankle fracture in good position #2 right lateral ankle pain    Plan:     Soft talked her about a couple things a day.  She does have 2 syndesmosis screws and she is younger and more active than a lot of the patients that we use this technique on so I have explained to her that removal of the 2 screws especially the 2 syndesmosis screws may help her some with her range of motion and discomfort.  Is little hard to predict how much to promise her for this.  She really is still improving and she likely just sort of think about this so what we have decided to do was sort of give it another 6 weeks I will see her back in 6 weeks with 3 views

## 2024-11-26 ENCOUNTER — OFFICE VISIT (OUTPATIENT)
Dept: ORTHOPEDIC SURGERY | Age: 69
End: 2024-11-26
Payer: MEDICARE

## 2024-11-26 DIAGNOSIS — S82.841D CLOSED BIMALLEOLAR FRACTURE OF RIGHT ANKLE WITH ROUTINE HEALING, SUBSEQUENT ENCOUNTER: Primary | ICD-10-CM

## 2024-11-26 PROCEDURE — 99212 OFFICE O/P EST SF 10 MIN: CPT | Performed by: ORTHOPAEDIC SURGERY

## 2024-11-26 PROCEDURE — 1123F ACP DISCUSS/DSCN MKR DOCD: CPT | Performed by: ORTHOPAEDIC SURGERY

## 2024-11-26 NOTE — PROGRESS NOTES
Progress Note    Patient: Ana Maria Hicks MRN: [id number]  SSN: [id number]    YOB: 1955  Age: 68 y.o.  Sex: female        11/26/2024      Subjective:     Patient is here today in follow-up.  She is about 5 months out now from her by mouth fracture treated with screw fixation.  She really is doing well she is going to the gym she says she cannot quite do the same amount of weight with her right ankle but she definitely thinks is improving and she is pretty pleased with how she is doing    Objective:     There were no vitals filed for this visit.       Physical Exam:     Skin - incision is well healed with no redness or drainage  Motor and sensory function intact in RIGHT LOWER extremity  Pulses palpable in RIGHT LOWER extremity     XRAY FINDINGS:  [id number]-[id number] right bimalleolar ankle fracture, findings-3 views of the right ankle shows the right bimalleolar ankle fracture has healed healed in very reasonable position.  The hardware is intact with no evidence of loosening or failure and the overall alignment is near-anatomic.  The ankle mortise itself is very well aligned.  Impression-healed well aligned right bimalleolar ankle fracture    Assessment:     Healed right bimalleolar ankle fracture    Plan:     So we talked little bit about hardware removal as we did last time and she really is at the point where she feels pretty confident that she is doing well she is happy with how she is doing she does not want to have any further surgical intervention.  I think that is very reasonable and she is a very healthy active individual so I just encouraged her to continue basically do anything she feels like doing.  If she has any notion in the future that she would like to have her hardware removed would be happy to do this for her.  I talked to her little bit about osteoporosis management and she actually is already seeing by her family doctor who has followed this up very closely and and

## 2025-05-05 ENCOUNTER — HOSPITAL ENCOUNTER (OUTPATIENT)
Dept: MAMMOGRAPHY | Age: 70
Discharge: HOME | End: 2025-05-08
Payer: MEDICARE

## 2025-05-05 VITALS — BODY MASS INDEX: 20.89 KG/M2 | HEIGHT: 66 IN | WEIGHT: 130 LBS

## 2025-05-05 DIAGNOSIS — Z12.31 VISIT FOR SCREENING MAMMOGRAM: ICD-10-CM

## 2025-05-05 PROCEDURE — 77063 BREAST TOMOSYNTHESIS BI: CPT
